# Patient Record
Sex: MALE | Race: WHITE | HISPANIC OR LATINO | URBAN - METROPOLITAN AREA
[De-identification: names, ages, dates, MRNs, and addresses within clinical notes are randomized per-mention and may not be internally consistent; named-entity substitution may affect disease eponyms.]

---

## 2019-08-13 ENCOUNTER — INPATIENT (INPATIENT)
Facility: HOSPITAL | Age: 69
LOS: 1 days | Discharge: HOME | End: 2019-08-15
Attending: HOSPITALIST | Admitting: HOSPITALIST
Payer: COMMERCIAL

## 2019-08-13 VITALS
TEMPERATURE: 98 F | HEART RATE: 96 BPM | OXYGEN SATURATION: 99 % | RESPIRATION RATE: 18 BRPM | SYSTOLIC BLOOD PRESSURE: 152 MMHG | WEIGHT: 175.05 LBS | HEIGHT: 70 IN | DIASTOLIC BLOOD PRESSURE: 91 MMHG

## 2019-08-13 DIAGNOSIS — I10 ESSENTIAL (PRIMARY) HYPERTENSION: ICD-10-CM

## 2019-08-13 DIAGNOSIS — M86.9 OSTEOMYELITIS, UNSPECIFIED: ICD-10-CM

## 2019-08-13 DIAGNOSIS — E78.5 HYPERLIPIDEMIA, UNSPECIFIED: ICD-10-CM

## 2019-08-13 LAB
ALBUMIN SERPL ELPH-MCNC: 3.7 G/DL — SIGNIFICANT CHANGE UP (ref 3.3–5)
ALP SERPL-CCNC: 112 U/L — SIGNIFICANT CHANGE UP (ref 40–120)
ALT FLD-CCNC: 12 U/L — SIGNIFICANT CHANGE UP (ref 12–78)
ANION GAP SERPL CALC-SCNC: 10 MMOL/L — SIGNIFICANT CHANGE UP (ref 5–17)
APTT BLD: 30 SEC — SIGNIFICANT CHANGE UP (ref 27.5–36.3)
AST SERPL-CCNC: 21 U/L — SIGNIFICANT CHANGE UP (ref 15–37)
BASOPHILS # BLD AUTO: 0.05 K/UL — SIGNIFICANT CHANGE UP (ref 0–0.2)
BASOPHILS NFR BLD AUTO: 0.8 % — SIGNIFICANT CHANGE UP (ref 0–2)
BILIRUB SERPL-MCNC: 0.4 MG/DL — SIGNIFICANT CHANGE UP (ref 0.2–1.2)
BUN SERPL-MCNC: 17 MG/DL — SIGNIFICANT CHANGE UP (ref 7–23)
CALCIUM SERPL-MCNC: 9.4 MG/DL — SIGNIFICANT CHANGE UP (ref 8.5–10.1)
CHLORIDE SERPL-SCNC: 111 MMOL/L — HIGH (ref 96–108)
CO2 SERPL-SCNC: 24 MMOL/L — SIGNIFICANT CHANGE UP (ref 22–31)
CREAT SERPL-MCNC: 1.04 MG/DL — SIGNIFICANT CHANGE UP (ref 0.5–1.3)
CRP SERPL-MCNC: 0.52 MG/DL — HIGH (ref 0–0.4)
EOSINOPHIL # BLD AUTO: 0.18 K/UL — SIGNIFICANT CHANGE UP (ref 0–0.5)
EOSINOPHIL NFR BLD AUTO: 2.9 % — SIGNIFICANT CHANGE UP (ref 0–6)
ERYTHROCYTE [SEDIMENTATION RATE] IN BLOOD: 43 MM/HR — HIGH (ref 0–20)
GLUCOSE SERPL-MCNC: 100 MG/DL — HIGH (ref 70–99)
HCT VFR BLD CALC: 37.3 % — LOW (ref 39–50)
HGB BLD-MCNC: 12.1 G/DL — LOW (ref 13–17)
IMM GRANULOCYTES NFR BLD AUTO: 0.8 % — SIGNIFICANT CHANGE UP (ref 0–1.5)
INR BLD: 0.97 RATIO — SIGNIFICANT CHANGE UP (ref 0.88–1.16)
LYMPHOCYTES # BLD AUTO: 0.98 K/UL — LOW (ref 1–3.3)
LYMPHOCYTES # BLD AUTO: 15.6 % — SIGNIFICANT CHANGE UP (ref 13–44)
MCHC RBC-ENTMCNC: 30.7 PG — SIGNIFICANT CHANGE UP (ref 27–34)
MCHC RBC-ENTMCNC: 32.4 GM/DL — SIGNIFICANT CHANGE UP (ref 32–36)
MCV RBC AUTO: 94.7 FL — SIGNIFICANT CHANGE UP (ref 80–100)
MONOCYTES # BLD AUTO: 0.72 K/UL — SIGNIFICANT CHANGE UP (ref 0–0.9)
MONOCYTES NFR BLD AUTO: 11.5 % — SIGNIFICANT CHANGE UP (ref 2–14)
NEUTROPHILS # BLD AUTO: 4.3 K/UL — SIGNIFICANT CHANGE UP (ref 1.8–7.4)
NEUTROPHILS NFR BLD AUTO: 68.4 % — SIGNIFICANT CHANGE UP (ref 43–77)
NRBC # BLD: 0 /100 WBCS — SIGNIFICANT CHANGE UP (ref 0–0)
PLATELET # BLD AUTO: 305 K/UL — SIGNIFICANT CHANGE UP (ref 150–400)
POTASSIUM SERPL-MCNC: 5 MMOL/L — SIGNIFICANT CHANGE UP (ref 3.5–5.3)
POTASSIUM SERPL-SCNC: 5 MMOL/L — SIGNIFICANT CHANGE UP (ref 3.5–5.3)
PROT SERPL-MCNC: 8.2 GM/DL — SIGNIFICANT CHANGE UP (ref 6–8.3)
PROTHROM AB SERPL-ACNC: 10.8 SEC — SIGNIFICANT CHANGE UP (ref 10–12.9)
RBC # BLD: 3.94 M/UL — LOW (ref 4.2–5.8)
RBC # FLD: 12.5 % — SIGNIFICANT CHANGE UP (ref 10.3–14.5)
SODIUM SERPL-SCNC: 145 MMOL/L — SIGNIFICANT CHANGE UP (ref 135–145)
WBC # BLD: 6.28 K/UL — SIGNIFICANT CHANGE UP (ref 3.8–10.5)
WBC # FLD AUTO: 6.28 K/UL — SIGNIFICANT CHANGE UP (ref 3.8–10.5)

## 2019-08-13 PROCEDURE — 99223 1ST HOSP IP/OBS HIGH 75: CPT

## 2019-08-13 PROCEDURE — 73130 X-RAY EXAM OF HAND: CPT | Mod: 26,RT

## 2019-08-13 PROCEDURE — 99285 EMERGENCY DEPT VISIT HI MDM: CPT

## 2019-08-13 RX ORDER — LOSARTAN POTASSIUM 100 MG/1
25 TABLET, FILM COATED ORAL DAILY
Refills: 0 | Status: DISCONTINUED | OUTPATIENT
Start: 2019-08-13 | End: 2019-08-15

## 2019-08-13 RX ORDER — VANCOMYCIN HCL 1 G
1000 VIAL (EA) INTRAVENOUS ONCE
Refills: 0 | Status: COMPLETED | OUTPATIENT
Start: 2019-08-13 | End: 2019-08-13

## 2019-08-13 RX ORDER — PIPERACILLIN AND TAZOBACTAM 4; .5 G/20ML; G/20ML
3.38 INJECTION, POWDER, LYOPHILIZED, FOR SOLUTION INTRAVENOUS ONCE
Refills: 0 | Status: COMPLETED | OUTPATIENT
Start: 2019-08-13 | End: 2019-08-13

## 2019-08-13 RX ORDER — ACETAMINOPHEN 500 MG
650 TABLET ORAL EVERY 6 HOURS
Refills: 0 | Status: DISCONTINUED | OUTPATIENT
Start: 2019-08-13 | End: 2019-08-15

## 2019-08-13 RX ORDER — SIMVASTATIN 20 MG/1
20 TABLET, FILM COATED ORAL AT BEDTIME
Refills: 0 | Status: DISCONTINUED | OUTPATIENT
Start: 2019-08-13 | End: 2019-08-15

## 2019-08-13 RX ORDER — SIMVASTATIN 20 MG/1
1 TABLET, FILM COATED ORAL
Qty: 0 | Refills: 0 | DISCHARGE

## 2019-08-13 RX ADMIN — SIMVASTATIN 20 MILLIGRAM(S): 20 TABLET, FILM COATED ORAL at 22:10

## 2019-08-13 NOTE — ED ADULT TRIAGE NOTE - CHIEF COMPLAINT QUOTE
pt complaining of redness, pain and swelling to right hand pinky finger times 2 weeks. was started on oral antibiotics by PMD with no effect.  denies any medical history.

## 2019-08-13 NOTE — H&P ADULT - NSHPPHYSICALEXAM_GEN_ALL_CORE
ICU Vital Signs Last 24 Hrs  T(C): 36.7 (13 Aug 2019 10:58), Max: 36.7 (13 Aug 2019 10:58)  T(F): 98.1 (13 Aug 2019 10:58), Max: 98.1 (13 Aug 2019 10:58)  HR: 96 (13 Aug 2019 10:58) (96 - 96)  BP: 152/91 (13 Aug 2019 10:58) (152/91 - 152/91)  BP(mean): --  ABP: --  ABP(mean): --  RR: 18 (13 Aug 2019 10:58) (18 - 18)  SpO2: 99% (13 Aug 2019 10:58) (99% - 99%)  GENERAL: NAD well-developed  HEAD:  Atraumatic, Normocephalic  EYES: EOMI, PERRLA, conjunctiva and sclera clear  ENMT: No tonsillar erythema, exudates, or enlargement; Moist mucous membranes, Good dentition, No lesions  NECK: Supple, No JVD, Normal thyroid  NERVOUS SYSTEM:  Alert & Oriented X3, Good concentration; Motor Strength 5/5 B/L upper and lower extremities; DTRs 2+ intact and symmetric  CHEST/LUNG: Clear to percussion bilaterally; No rales, rhonchi, wheezing, or rubs  HEART: Regular rate and rhythm; No murmurs, rubs, or gallops  ABDOMEN: Soft, Nontender, Nondistended; Bowel sounds present  EXTREMITIES:  2+ Peripheral Pulses, No clubbing, cyanosis, or edema  LYMPH: No lymphadenopathy   SKIN: No rashes or lesions

## 2019-08-13 NOTE — CONSULT NOTE ADULT - SUBJECTIVE AND OBJECTIVE BOX
Infectious Diseases - Attending at Dr. Harris    HPI:  · 69yo male with no pertinent pmh presents with rt 5th PIP joint swelling x 2 weeks, started mildly but gettting worse/bigger. Seen by PMD 3 days ago, taken 3 days of abx, no improvement so came to ER.  Denies fever, trauma, open wounds/bug bites.	     PAST MEDICAL & SURGICAL HISTORY:  Hyperlipemia  HTN (hypertension)  No significant past surgical history      Allergies    No Known Allergies    Intolerances        FAMILY HISTORY: no pertinent history of DM, HTN on parents ,siblings      SOCIAL HISTORY: non smoker    REVIEW OF SYSTEMS:    Constitutional: No fever, weight loss or fatigue  Eyes: No eye pain, visual disturbances, or discharge  ENT:  No difficulty hearing, tinnitus, vertigo; No sinus or throat pain  Neck: No pain or stiffness  Respiratory: No cough, wheezing, chills or hemoptysis  Cardiovascular: No chest pain, palpitations, shortness of breath, dizziness or leg swelling  Gastrointestinal: No abdominal or epigastric pain. No nausea, vomiting or hematemesis; No diarrhea or constipation. No melena or hematochezia.  Genitourinary: No dysuria, frequency, hematuria or incontinence  Neurological: No headaches, memory loss, loss of strength, numbness or tremors  Skin: Right fifth finger swelling,  No itching, burning, rashes or lesions       MEDICATIONS  (STANDING):    MEDICATIONS  (PRN):      Vital Signs Last 24 Hrs  T(C): 36.7 (13 Aug 2019 10:58), Max: 36.7 (13 Aug 2019 10:58)  T(F): 98.1 (13 Aug 2019 10:58), Max: 98.1 (13 Aug 2019 10:58)  HR: 96 (13 Aug 2019 10:58) (96 - 96)  BP: 152/91 (13 Aug 2019 10:58) (152/91 - 152/91)  BP(mean): --  RR: 18 (13 Aug 2019 10:58) (18 - 18)  SpO2: 99% (13 Aug 2019 10:58) (99% - 99%)    PHYSICAL EXAM:    Constitutional: NAD, well-groomed, well-developed  HEENT: PERRLA, EOMI, Normal Hearing, MMM  Neck: No LAD, No JVD  Back: Normal spine flexure, No CVA tenderness  Respiratory: CTAB/L  Cardiovascular: S1 and S2, RRR, no M/G/R  Gastrointestinal: BS+, soft, NT/ND  Extremities: No peripheral edema  Vascular: 2+ peripheral pulses  Neurological: A/O x 3, no focal deficits  Skin: right  fifth finger swelling with tenderness ,skin peeling over the finger      LABS:                        12.1   6.28  )-----------( 305      ( 13 Aug 2019 14:12 )             37.3     08-13    145  |  111<H>  |  17  ----------------------------<  100<H>  5.0   |  24  |  1.04    Ca    9.4      13 Aug 2019 14:12    TPro  8.2  /  Alb  3.7  /  TBili  0.4  /  DBili  x   /  AST  21  /  ALT  12  /  AlkPhos  112  08-13    ESR 43      MICROBIOLOGY:  RECENT CULTURES:        RADIOLOGY & ADDITIONAL STUDIES:    Xray  INTERPRETATION:  Right hand attention fifth finger    HISTORY: Swelling at PIP joint      Three views of the right hand show soft tissue swelling of the fifth   finger with decreased bone density of the distal aspect of the fifth   proximal phalanx which raises the possibility of underlying bone   infection. The joint spaces are maintained.    IMPRESSION: Possible early osteomyelitis, fifth proximal phalanx.

## 2019-08-13 NOTE — ED PROVIDER NOTE - PHYSICAL EXAMINATION
Gen: Alert, Well appearing. NAD    Head: NC, AT, PERRL, normal lids  Neck: supple, no tenderness/meningismus  Pulm: Bilateral clear BS, normal resp effort  CV: RRR, no M/R/G, +dist pulses   Abd: soft, NT/ND, +BS, no guarding/rebound tenderness  Mskel: +++ 5th pip swelling and tenderness. no warmth. + mild erythema and scaly skin cr intact. + able to passively range but with pain.  Skin: no rash, no bruising  Neuro: AAOx3, no sensory/motor deficits, CN 2-12 intact

## 2019-08-13 NOTE — ED ADULT NURSE NOTE - OBJECTIVE STATEMENT
pt a7O x3, pt c/o of 2 weeks worsening swelling in right 5ht digits. intermittent pain. PMh htn, high cholesterol. no difficulty with ROM, sensation intact

## 2019-08-13 NOTE — H&P ADULT - ASSESSMENT
68m with history of htn/cholesterol and chest pain pain to the right 5th fingfer - as per dr castro no antibiotics till after mri and decision by plastics regarding aspiration       IMPROVE VTE Individual Risk Assessment          RISK                                                          Points  [  ] Previous VTE                                                3  [  ] Thrombophilia                                             2  [  ] Lower limb paralysis                                   2        (unable to hold up >15 seconds)    [  ] Current Cancer                                             2         (within 6 months)  [  ] Immobilization > 24 hrs                              1  [  ] ICU/CCU stay > 24 hours                             1  [ x ] Age > 60                                                         1    IMPROVE VTE Score:         [     1    ]    Total Risk Factor Score:    0 - 1:   Consider IPC  >2 - 3:  Thromboprophylaxis required (enoxaparin or SQ heparin)        >4:   High Risk: Thromboprophylaxis required (enoxaparin or SQ heparin), optional add IPC  **If CONTRAINDICATION to enoxaparin or SQ heparin, USE IPCs**

## 2019-08-13 NOTE — H&P ADULT - NSHPLANGLIMITEDENGLISH_GEN_A_CORE
Patient remains tobacco free at this time. Patient is using strategies previously discussed. The patient remains on the prescribed tobacco cessation medication regimen of 1 mg Chantix BID without any negative side effects at this time.   She dis try not using the nicotine patch for a few days as I had recommended and she believes that the patch does aid in keeping urges to minimum. I will wean the dose to 14 mg and encouraged her to wean off completely. The patient will continue to come to the clinic for additional support and encouragement.   No

## 2019-08-13 NOTE — H&P ADULT - NSHPREVIEWOFSYSTEMS_GEN_ALL_CORE

## 2019-08-13 NOTE — ED PROVIDER NOTE - OBJECTIVE STATEMENT
69yo male with no pertinent pmh presents with rt 5th PIP joint swelling x 2 weeks, started mildly but gettting worse/bigger. Seen by PMD 3 days ago, taken 3 days of abx, no improvement so came to ER.  Denies fever, trauma, open wounds/bug bites.    ROS: No fever/chills. No photophobia/eye pain/changes in vision, No ear pain/sore throat/dysphagia, No chest pain/palpitations. No SOB/cough. No abdominal pain, No N/V/D, no black/bloody bm. No dysuria/frequency/discharge, No headache. No Dizziness.  No rash.  No numbness/tingling/weakness.

## 2019-08-13 NOTE — PROGRESS NOTE ADULT - SUBJECTIVE AND OBJECTIVE BOX
· 67yo male with no pertinent pmh presents with rt 5th PIP joint swelling x 2 weeks, started mildly but gettting worse/bigger. Seen by PMD 3 days ago, taken 3 days of abx, no improvement so came to ER.  Denies fever, trauma, open wounds/bug bites.	 No fever/chills. No photophobia/eye pain/changes in vision, No ear pain/sore throat/dysphagia, No chest pain/palpitations. No SOB/cough. No abdominal pain, No N/V/D, no black/bloody bm. No dysuria/frequency/discharge, No headache. No Dizziness.  No rash.  No numbness/tingling/weakness.    Plastic surgery was called for consult. Review of the chart and HPI reveals that there is no evidence of any traumatic injury to the digit, no laceration noted, no open wounds. Since this is the case, the patient needs ot be worked up for other etiologies for his PIP swelling.    Workup needs to included: Gout, Infectious arthritis, or immune related arthritis. Patient does not have any other history of issues with this joint. WBC is not elevated. X-ray shows some destruction of the joint, but this may not be due to a clear etiology.     Recommendations:)    1.) Warm Compressed over the joint 3x/day  2.) Elevated the Right Hand with 3-4 Pillows to aid in drainage  3.) MRI to evaluate for Osteomyelitis  4.) IV ABX - ID Consult  5.) Uric Acid Levels  6.) Patient has no laceration or traumatic cause for his swelling, therefore this is not a surgical issues at this time. If he truly has Osteomyelitis, then he needs 6 weeks of ABX treatment (Conservative management). If conservative management fails, then surgical intervention with debridement or amputation will be considered.     Will try and see the patient Thursday, however I will be going out of town. If not Thursday, Next Monday.  If the patient is discharged, please have him follow up in my office next week for evaluation of the joint. At this time imaging should be performed and a trial of IV ABX should be initiated for at least 2-3 days in hospital.    Will continue to follow the patient.

## 2019-08-13 NOTE — ED PROVIDER NOTE - CLINICAL SUMMARY MEDICAL DECISION MAKING FREE TEXT BOX
xray with possible osteo, will admit for iv abx. dr johnson aware. dr burnham paged for hand. and dr page paged for ID.

## 2019-08-14 LAB
HCV AB S/CO SERPL IA: 0.09 S/CO — SIGNIFICANT CHANGE UP (ref 0–0.99)
HCV AB SERPL-IMP: SIGNIFICANT CHANGE UP
URATE SERPL-MCNC: 7 MG/DL — SIGNIFICANT CHANGE UP (ref 3.4–8.8)

## 2019-08-14 PROCEDURE — 93010 ELECTROCARDIOGRAM REPORT: CPT

## 2019-08-14 PROCEDURE — 99233 SBSQ HOSP IP/OBS HIGH 50: CPT

## 2019-08-14 PROCEDURE — 99232 SBSQ HOSP IP/OBS MODERATE 35: CPT

## 2019-08-14 PROCEDURE — 99223 1ST HOSP IP/OBS HIGH 75: CPT

## 2019-08-14 PROCEDURE — 73218 MRI UPPER EXTREMITY W/O DYE: CPT | Mod: 26,RT

## 2019-08-14 RX ORDER — INDOMETHACIN 50 MG
50 CAPSULE ORAL THREE TIMES A DAY
Refills: 0 | Status: DISCONTINUED | OUTPATIENT
Start: 2019-08-14 | End: 2019-08-15

## 2019-08-14 RX ADMIN — Medication 50 MILLIGRAM(S): at 22:08

## 2019-08-14 RX ADMIN — SIMVASTATIN 20 MILLIGRAM(S): 20 TABLET, FILM COATED ORAL at 22:08

## 2019-08-14 RX ADMIN — Medication 50 MILLIGRAM(S): at 22:09

## 2019-08-14 RX ADMIN — Medication 50 MILLIGRAM(S): at 22:39

## 2019-08-14 RX ADMIN — LOSARTAN POTASSIUM 25 MILLIGRAM(S): 100 TABLET, FILM COATED ORAL at 05:20

## 2019-08-14 RX ADMIN — Medication 50 MILLIGRAM(S): at 22:38

## 2019-08-14 NOTE — PROGRESS NOTE ADULT - SUBJECTIVE AND OBJECTIVE BOX
Infectious Diseases - Attending at Dr. Harris    HPI:  · 67yo male with no pertinent pmh presents with rt 5th PIP joint swelling x 2 weeks, started mildly but gettting worse/bigger. Seen by PMD 3 days ago, taken 3 days of abx, no improvement so came to ER.  Denies fever, trauma, open wounds/bug bites. No fever/chills. No photophobia/eye pain/changes in vision, No ear pain/sore throat/dysphagia, No chest pain/palpitations. No SOB/cough. No abdominal pain, No N/V/D, no black/bloody bm. No dysuria/frequency/discharge, No headache. No Dizziness.  No rash.  No numbness/tingling/weakness. (13 Aug 2019 15:28)        PAST MEDICAL & SURGICAL HISTORY:  Hyperlipemia  HTN (hypertension)  No significant past surgical history      Allergies    No Known Allergies    Intolerances        FAMILY HISTORY:      SOCIAL HISTORY:    REVIEW OF SYSTEMS:    Constitutional: No fever, weight loss or fatigue  Eyes: No eye pain, visual disturbances, or discharge  ENT:  No difficulty hearing, tinnitus, vertigo; No sinus or throat pain  Neck: No pain or stiffness  Respiratory: No cough, wheezing, chills or hemoptysis  Cardiovascular: No chest pain, palpitations, shortness of breath, dizziness or leg swelling  Gastrointestinal: No abdominal or epigastric pain. No nausea, vomiting or hematemesis; No diarrhea or constipation. No melena or hematochezia.  Genitourinary: No dysuria, frequency, hematuria or incontinence  Neurological: No headaches, memory loss, loss of strength, numbness or tremors  Skin: No itching, burning, rashes or lesions       MEDICATIONS  (STANDING):  losartan 25 milliGRAM(s) Oral daily  simvastatin 20 milliGRAM(s) Oral at bedtime    MEDICATIONS  (PRN):  acetaminophen   Tablet .. 650 milliGRAM(s) Oral every 6 hours PRN Temp greater or equal to 38C (100.4F), Mild Pain (1 - 3)      Vital Signs Last 24 Hrs  T(C): 37 (14 Aug 2019 11:23), Max: 37.2 (13 Aug 2019 18:10)  T(F): 98.6 (14 Aug 2019 11:23), Max: 98.9 (13 Aug 2019 18:10)  HR: 75 (14 Aug 2019 11:23) (62 - 77)  BP: 126/76 (14 Aug 2019 11:23) (126/76 - 159/98)  BP(mean): --  RR: 17 (14 Aug 2019 11:23) (17 - 17)  SpO2: 98% (14 Aug 2019 11:23) (95% - 100%)    PHYSICAL EXAM:    Constitutional: NAD, well-groomed, well-developed  HEENT: PERRLA, EOMI, Normal Hearing, MMM  Neck: No LAD, No JVD  Back: Normal spine flexure, No CVA tenderness  Respiratory: CTAB/L  Cardiovascular: S1 and S2, RRR, no M/G/R  Gastrointestinal: BS+, soft, NT/ND  Extremities: No peripheral edema  Vascular: 2+ peripheral pulses  Neurological: A/O x 3, no focal deficits  Skin: No rashes      LABS:                        12.1   6.28  )-----------( 305      ( 13 Aug 2019 14:12 )             37.3     08-13    145  |  111<H>  |  17  ----------------------------<  100<H>  5.0   |  24  |  1.04    Ca    9.4      13 Aug 2019 14:12    TPro  8.2  /  Alb  3.7  /  TBili  0.4  /  DBili  x   /  AST  21  /  ALT  12  /  AlkPhos  112  08-13    PT/INR - ( 13 Aug 2019 15:49 )   PT: 10.8 sec;   INR: 0.97 ratio         PTT - ( 13 Aug 2019 15:49 )  PTT:30.0 sec      MICROBIOLOGY:  RECENT CULTURES:        RADIOLOGY & ADDITIONAL STUDIES: Patient is a 68y old  Male who presents with a chief complaint of finger septic arthritis (14 Aug 2019 16:22)      INTERVAL HPI / OVERNIGHT EVENTS: doing ok     MEDICATIONS  (STANDING):  losartan 25 milliGRAM(s) Oral daily  simvastatin 20 milliGRAM(s) Oral at bedtime    MEDICATIONS  (PRN):  acetaminophen   Tablet .. 650 milliGRAM(s) Oral every 6 hours PRN Temp greater or equal to 38C (100.4F), Mild Pain (1 - 3)      Vital Signs Last 24 Hrs  T(C): 37 (14 Aug 2019 11:23), Max: 37.2 (13 Aug 2019 18:10)  T(F): 98.6 (14 Aug 2019 11:23), Max: 98.9 (13 Aug 2019 18:10)  HR: 75 (14 Aug 2019 11:23) (62 - 77)  BP: 126/76 (14 Aug 2019 11:23) (126/76 - 159/98)  BP(mean): --  RR: 17 (14 Aug 2019 11:23) (17 - 17)  SpO2: 98% (14 Aug 2019 11:23) (95% - 100%)    Review of systems:  General : no fever /chills,fatigue  CVS : no chest pain, palpitations  Lungs : no shortness of breath, cough  GI : no abdominal pain,vomiting, diarrhea   : no dysuria,hematuria        PHYSICAL EXAM:  General :NAD  Constitutional:  well-groomed, well-developed  Respiratory: CTAB/L  Cardiovascular: S1 and S2, RRR, no M/G/R  Gastrointestinal: BS+, soft, NT/ND  Extremities: No peripheral edema  Vascular: 2+ peripheral pulses  Skin: right fifth finger swelling      LABS:   ESR 43  CRP 0.52  Uric acid WNL             12.1   6.28  )-----------( 305      ( 13 Aug 2019 14:12 )             37.3     08-13    145  |  111<H>  |  17  ----------------------------<  100<H>  5.0   |  24  |  1.04    Ca    9.4      13 Aug 2019 14:12    TPro  8.2  /  Alb  3.7  /  TBili  0.4  /  DBili  x   /  AST  21  /  ALT  12  /  AlkPhos  112  08-13          MICROBIOLOGY:  RECENT CULTURES:        RADIOLOGY & ADDITIONAL STUDIES:    MRI hand:   IMPRESSION: Subluxations of the second, third and fourth MCP joints most   prominently involving the third digit with associated osseous productive   change and capsular thickening/degeneration. These findings may be   secondary to CPPD arthropathy or some combination of a degenerative and   inflammatory arthropathy.    Findings highly suggestive of osteomyelitis of the distal half of the   fifth digit proximal phalanx and the base of the middle phalanx with   septic arthritis at the PIP joint and marked adjacent soft tissue   swelling. Small fluid collection at the dorsal aspect of the extensor   tendon that may represent infected fluid as well.

## 2019-08-14 NOTE — PROGRESS NOTE ADULT - PROBLEM SELECTOR PLAN 1
mri of hand reviewed and highly suspicious of osteo and septic joint of  the right 5th PIP. discussed with hand surgeon who will aspirate the joint for cell count/culture and crystal analysis. Pt has no WBC and remains afebrile  clinically has RA based on PE and is so there could be a component of inflammatory disease.  Will get rheumatology consult.   plastics/id consult

## 2019-08-14 NOTE — CONSULT NOTE ADULT - SUBJECTIVE AND OBJECTIVE BOX
HISTORY OF PRESENT ILLNESS:    Patient is a 68y Male    HPI:  68 year old male with PMHx as listed below reports that he was in his USOH until 2.5 weeks ago, when he began to experience progressively worsening pain/swelling in his right 5th PIP.  The pain is constant, though worse with activity and better at rest.  He describes the pain as achy - was 9 out of 10.  No AM stiffness.   After about 1 week, it did not improve, so he went to his PMD who prescribed abx x 3 days, but no improvement so he came in to the ED.  He currently reports that the pain and swelling have begun to improve - pain now down to 5 out of 10.  No pain ni the rest of his joints.  No similar previous episodes.    PAST MEDICAL & SURGICAL HISTORY:  Hyperlipemia  HTN (hypertension)  No significant past surgical history      ROS: No fever/chills, wt loss, night sweats, chest pain/dyspnea/cough, oral ulcers, rashes, alopecia, photosensitivity, dry eye/dry mouth, Raynaud's, dysphagia, focal weakness, or eye symptoms.  No personal or family hx of psoriasis, no hx of uveitis/dactylitis, no inflammatory back pain, no IBD symptoms.    MEDICATIONS  (STANDING):  losartan 25 milliGRAM(s) Oral daily  simvastatin 20 milliGRAM(s) Oral at bedtime    MEDICATIONS  (PRN):  acetaminophen   Tablet .. 650 milliGRAM(s) Oral every 6 hours PRN Temp greater or equal to 38C (100.4F), Mild Pain (1 - 3)      Allergies    No Known Allergies    Intolerances        FAMILY HISTORY:      SOCIAL HISTORY:  Tobacco--   none            Vital Signs Last 24 Hrs  T(C): 36.7 (14 Aug 2019 17:02), Max: 37 (14 Aug 2019 11:23)  T(F): 98.1 (14 Aug 2019 17:02), Max: 98.6 (14 Aug 2019 11:23)  HR: 74 (14 Aug 2019 17:02) (62 - 75)  BP: 131/96 (14 Aug 2019 17:02) (126/76 - 133/88)  BP(mean): --  RR: 18 (14 Aug 2019 17:02) (17 - 18)  SpO2: 100% (14 Aug 2019 17:02) (95% - 100%)    PHYSICAL EXAM:  General :  NAD  HEENT--  no oral ulcers  Nodes--   LAD  Lungs--  CTA B/L  Heart--  RRR, nlS1 &S2 normal;   Abdomen--  soft, NT, ND +BS  Skin:  no rashes  Musculoskeletal exam:  (+)swelling/tenderness, warmth, mild erythema in right 5th PIP;  pt able to flex right 5th PIP, though ROM is reduced;  No synovitis in rest of joints;  (+)Heberden's nodes;  (+)bony enlargement of left 4th PIP;  (+)ulnar deviation of right 2nd finger      LABS:                        12.1   6.28  )-----------( 305      ( 13 Aug 2019 14:12 )             37.3     08-13    145  |  111<H>  |  17  ----------------------------<  100<H>  5.0   |  24  |  1.04    Ca    9.4      13 Aug 2019 14:12    TPro  8.2  /  Alb  3.7  /  TBili  0.4  /  DBili  x   /  AST  21  /  ALT  12  /  AlkPhos  112  08-13    PT/INR - ( 13 Aug 2019 15:49 )   PT: 10.8 sec;   INR: 0.97 ratio         PTT - ( 13 Aug 2019 15:49 )  PTT:30.0 sec    Uric Acid, Serum (08.14.19 @ 12:47)    Uric Acid, Serum: 7.0 mg/dL    Sedimentation Rate, Erythrocyte (08.13.19 @ 14:12)    Sedimentation Rate, Erythrocyte: 43 mm/hr    C-Reactive Protein, Serum (08.13.19 @ 19:54)    C-Reactive Protein, Serum: 0.52 mg/dL          RADIOLOGY & ADDITIONAL STUDIES:    < from: Xray Hand 3 Views, Right (08.13.19 @ 13:36) >  EXAM:  HAND COMPLETE  3 VIEWS RT                            PROCEDURE DATE:  08/13/2019          INTERPRETATION:  Right hand attention fifth finger    HISTORY: Swelling at PIP joint      Three views of the right hand show soft tissue swelling of the fifth   finger with decreased bone density of the distal aspect of the fifth   proximal phalanx which raises the possibility of underlying bone   infection. The joint spaces are maintained.    IMPRESSION: Possible early osteomyelitis, fifth proximalphalanx.    < end of copied text >    < from: MR Hand No Cont, Right (08.14.19 @ 09:51) >  EXAM:  MR HAND RT                            PROCEDURE DATE:  08/14/2019          INTERPRETATION:  MRI OF THE RIGHT HAND.    CLINICAL INDICATIONS: Fifth digit swelling. Evaluate for osteomyelitis.     TECHNIQUE: Multiplanar MR imaging was obtained of the right hand.    FINDINGS: There is palmar subluxation of the third digit proximal phalanx   with respect to the metacarpal head with marked capsular thickening and   degeneration along with osseous productive change. Similar findings with   a lesser degree of subluxation is noted of the second and fourth digit   MCP joints with associated arthrosis.    Evaluation of the fifth digit, demonstrates diffuse low T1 signal within   the mid and distal aspect of the fifth digit proximal phalanx as well as   at the base of the middle phalanx with associated bone marrow edema.   There is marked surrounding periosteal soft tissue edema with a complex   appearing debris filled joint effusion at the fifth PIP joint. These   findings appear most compatible with with osteomyelitis and septic   arthritis. There is a loculated fluid collection at the dorsal aspect of   the fifth digit extensor tendon. This measures 11 mm x 4 mm x 26 mm. A   soft tissue abscess cannot be excluded.    There is circumferential subcutaneous soft tissue edema of the fifth   digit particularly at the level of the PIP joint.    IMPRESSION: Subluxations of the second, third and fourth MCP joints most   prominently involving the third digit with associated osseous productive   change and capsular thickening/degeneration. These findings may be   secondary to CPPD arthropathy or some combination of a degenerative and   inflammatory arthropathy.    Findings highly suggestive of osteomyelitis of the distal half of the   fifth digit proximal phalanx and the base of the middle phalanx with   septic arthritis at the PIP joint and marked adjacent soft tissue   swelling. Small fluid collection at the dorsal aspect of the extensor   tendon that may represent infected fluid as well.    < end of copied text >

## 2019-08-14 NOTE — PROGRESS NOTE ADULT - SUBJECTIVE AND OBJECTIVE BOX
Patient is a 68y old  Male who presents with a chief complaint of finger septic arthritis (14 Aug 2019 16:22)      INTERVAL HPI/OVERNIGHT EVENTS: none. remains afebrile     MEDICATIONS  (STANDING):  losartan 25 milliGRAM(s) Oral daily  simvastatin 20 milliGRAM(s) Oral at bedtime    MEDICATIONS  (PRN):  acetaminophen   Tablet .. 650 milliGRAM(s) Oral every 6 hours PRN Temp greater or equal to 38C (100.4F), Mild Pain (1 - 3)      Allergies    No Known Allergies    Intolerances      Vital Signs Last 24 Hrs  T(C): 37 (14 Aug 2019 11:23), Max: 37.2 (13 Aug 2019 18:10)  T(F): 98.6 (14 Aug 2019 11:23), Max: 98.9 (13 Aug 2019 18:10)  HR: 75 (14 Aug 2019 11:23) (62 - 77)  BP: 126/76 (14 Aug 2019 11:23) (126/76 - 159/98)  BP(mean): --  RR: 17 (14 Aug 2019 11:23) (17 - 17)  SpO2: 98% (14 Aug 2019 11:23) (95% - 100%)    PHYSICAL EXAM:  GENERAL: NAD, well-groomed, well-developed  HEAD:  Atraumatic, Normocephalic  EYES: EOMI, PERRLA, conjunctiva and sclera clear  ENMT: No tonsillar erythema, exudates, or enlargement; Moist mucous membranes, Good dentition, No lesions  NECK: Supple, No JVD, Normal thyroid  NERVOUS SYSTEM:  Alert & Oriented X3, Good concentration; Motor Strength 5/5 B/L upper and lower extremities; DTRs 2+ intact and symmetric  CHEST/LUNG: Clear to percussion bilaterally; No rales, rhonchi, wheezing, or rubs  HEART: Regular rate and rhythm; No murmurs, rubs, or gallops  ABDOMEN: Soft, Nontender, Nondistended; Bowel sounds present  EXTREMITIES:  2+ Peripheral Pulses, b/l ulnar deviations. Enlarged MCP b/l. 5 the right PIP grossly enlarged and tender. no warmth or erythema   LYMPH: No lymphadenopathy noted  SKIN: No rashes or lesions    LABS:                        12.1   6.28  )-----------( 305      ( 13 Aug 2019 14:12 )             37.3     08-13    145  |  111<H>  |  17  ----------------------------<  100<H>  5.0   |  24  |  1.04    Ca    9.4      13 Aug 2019 14:12    TPro  8.2  /  Alb  3.7  /  TBili  0.4  /  DBili  x   /  AST  21  /  ALT  12  /  AlkPhos  112  08-13    PT/INR - ( 13 Aug 2019 15:49 )   PT: 10.8 sec;   INR: 0.97 ratio         PTT - ( 13 Aug 2019 15:49 )  PTT:30.0 sec    CAPILLARY BLOOD GLUCOSE          RADIOLOGY & ADDITIONAL TESTS:    < from: MR Hand No Cont, Right (08.14.19 @ 09:51) >  MPRESSION: Subluxations of the second, third and fourth MCP joints most   prominently involving the third digit with associated osseous productive   change and capsular thickening/degeneration. These findings may be   secondary to CPPD arthropathy or some combination of a degenerative and   inflammatory arthropathy.    Findings highly suggestive of osteomyelitis of the distal half of the   fifth digit proximal phalanx and the base of the middle phalanx with   septic arthritis at the PIP joint and marked adjacent soft tissue   swelling. Small fluid collection at the dorsal aspect of the extensor   tendon that may represent infected fluid as well.    < end of copied text >      Imaging Personally Reviewed:  [x ] YES  [ ] NO    Consultant(s) Notes Reviewed:  [ ] YES  [ ] NO    Care Discussed with Consultants/Other Providers [ ] YES  [ ] NO

## 2019-08-15 ENCOUNTER — TRANSCRIPTION ENCOUNTER (OUTPATIENT)
Age: 69
End: 2019-08-15

## 2019-08-15 VITALS
DIASTOLIC BLOOD PRESSURE: 95 MMHG | SYSTOLIC BLOOD PRESSURE: 130 MMHG | HEART RATE: 75 BPM | TEMPERATURE: 98 F | OXYGEN SATURATION: 99 % | RESPIRATION RATE: 20 BRPM

## 2019-08-15 LAB
ANION GAP SERPL CALC-SCNC: 9 MMOL/L — SIGNIFICANT CHANGE UP (ref 5–17)
BUN SERPL-MCNC: 17 MG/DL — SIGNIFICANT CHANGE UP (ref 7–23)
CALCIUM SERPL-MCNC: 9.5 MG/DL — SIGNIFICANT CHANGE UP (ref 8.5–10.1)
CHLORIDE SERPL-SCNC: 108 MMOL/L — SIGNIFICANT CHANGE UP (ref 96–108)
CO2 SERPL-SCNC: 24 MMOL/L — SIGNIFICANT CHANGE UP (ref 22–31)
CREAT SERPL-MCNC: 0.95 MG/DL — SIGNIFICANT CHANGE UP (ref 0.5–1.3)
GLUCOSE SERPL-MCNC: 107 MG/DL — HIGH (ref 70–99)
HCT VFR BLD CALC: 37.7 % — LOW (ref 39–50)
HGB BLD-MCNC: 12.4 G/DL — LOW (ref 13–17)
MCHC RBC-ENTMCNC: 30.6 PG — SIGNIFICANT CHANGE UP (ref 27–34)
MCHC RBC-ENTMCNC: 32.9 GM/DL — SIGNIFICANT CHANGE UP (ref 32–36)
MCV RBC AUTO: 93.1 FL — SIGNIFICANT CHANGE UP (ref 80–100)
NRBC # BLD: 0 /100 WBCS — SIGNIFICANT CHANGE UP (ref 0–0)
PLATELET # BLD AUTO: 293 K/UL — SIGNIFICANT CHANGE UP (ref 150–400)
POTASSIUM SERPL-MCNC: 5 MMOL/L — SIGNIFICANT CHANGE UP (ref 3.5–5.3)
POTASSIUM SERPL-SCNC: 5 MMOL/L — SIGNIFICANT CHANGE UP (ref 3.5–5.3)
RBC # BLD: 4.05 M/UL — LOW (ref 4.2–5.8)
RBC # FLD: 12 % — SIGNIFICANT CHANGE UP (ref 10.3–14.5)
RHEUMATOID FACT SERPL-ACNC: 27 IU/ML — HIGH (ref 0–13)
SODIUM SERPL-SCNC: 141 MMOL/L — SIGNIFICANT CHANGE UP (ref 135–145)
WBC # BLD: 4.91 K/UL — SIGNIFICANT CHANGE UP (ref 3.8–10.5)
WBC # FLD AUTO: 4.91 K/UL — SIGNIFICANT CHANGE UP (ref 3.8–10.5)

## 2019-08-15 PROCEDURE — 99239 HOSP IP/OBS DSCHRG MGMT >30: CPT

## 2019-08-15 PROCEDURE — 99232 SBSQ HOSP IP/OBS MODERATE 35: CPT

## 2019-08-15 RX ORDER — LOSARTAN POTASSIUM 100 MG/1
1 TABLET, FILM COATED ORAL
Qty: 0 | Refills: 0 | DISCHARGE
Start: 2019-08-15

## 2019-08-15 RX ORDER — CEFPODOXIME PROXETIL 100 MG
200 TABLET ORAL EVERY 12 HOURS
Refills: 0 | Status: DISCONTINUED | OUTPATIENT
Start: 2019-08-15 | End: 2019-08-15

## 2019-08-15 RX ORDER — CEFPODOXIME PROXETIL 100 MG
1 TABLET ORAL
Qty: 20 | Refills: 0
Start: 2019-08-15 | End: 2019-08-24

## 2019-08-15 RX ORDER — INDOMETHACIN 50 MG
2 CAPSULE ORAL
Qty: 30 | Refills: 0
Start: 2019-08-15 | End: 2019-08-19

## 2019-08-15 RX ORDER — LOSARTAN POTASSIUM 100 MG/1
1 TABLET, FILM COATED ORAL
Qty: 0 | Refills: 0 | DISCHARGE

## 2019-08-15 RX ADMIN — Medication 50 MILLIGRAM(S): at 14:05

## 2019-08-15 RX ADMIN — Medication 100 MILLIGRAM(S): at 17:05

## 2019-08-15 RX ADMIN — LOSARTAN POTASSIUM 25 MILLIGRAM(S): 100 TABLET, FILM COATED ORAL at 05:40

## 2019-08-15 RX ADMIN — Medication 50 MILLIGRAM(S): at 13:05

## 2019-08-15 RX ADMIN — Medication 50 MILLIGRAM(S): at 05:41

## 2019-08-15 RX ADMIN — Medication 200 MILLIGRAM(S): at 17:05

## 2019-08-15 RX ADMIN — Medication 50 MILLIGRAM(S): at 06:11

## 2019-08-15 NOTE — PROGRESS NOTE ADULT - SUBJECTIVE AND OBJECTIVE BOX
Patient is a 68y old  Male who presents with a chief complaint of finger septic arthritis (14 Aug 2019 16:22)      INTERVAL HPI / OVERNIGHT EVENTS: finger swelling and pain improving, arthrocentesis was tried by plastics, no fluid aspirated     MEDICATIONS  (STANDING):  cefpodoxime 200 milliGRAM(s) Oral every 12 hours  doxycycline hyclate Capsule 100 milliGRAM(s) Oral every 12 hours  indomethacin 50 milliGRAM(s) Oral three times a day  losartan 25 milliGRAM(s) Oral daily  simvastatin 20 milliGRAM(s) Oral at bedtime    MEDICATIONS  (PRN):  acetaminophen   Tablet .. 650 milliGRAM(s) Oral every 6 hours PRN Temp greater or equal to 38C (100.4F), Mild Pain (1 - 3)      Vital Signs Last 24 Hrs  T(C): 36.8 (15 Aug 2019 12:27), Max: 36.8 (15 Aug 2019 12:27)  T(F): 98.3 (15 Aug 2019 12:27), Max: 98.3 (15 Aug 2019 12:27)  HR: 74 (15 Aug 2019 12:27) (58 - 74)  BP: 130/86 (15 Aug 2019 12:27) (122/84 - 131/96)  BP(mean): --  RR: 16 (15 Aug 2019 12:27) (16 - 18)  SpO2: 98% (15 Aug 2019 12:27) (98% - 100%)    Review of systems:  General : no fever /chills,fatigue  CVS : no chest pain, palpitations  Lungs : no shortness of breath, cough  GI : no abdominal pain,vomiting, diarrhea   : no dysuria,hematuria        PHYSICAL EXAM:  General :NAD  Constitutional:  well-groomed, well-developed  Respiratory: CTAB/L  Cardiovascular: S1 and S2, RRR, no M/G/R  Gastrointestinal: BS+, soft, NT/ND  Extremities: No peripheral edema, multiple finger joint swelling (less extent )   Vascular: 2+ peripheral pulses  Skin: right fifth finger swelling and tenderness slowly improving      LABS:                        12.4   4.91  )-----------( 293      ( 15 Aug 2019 10:02 )             37.7     08-15    141  |  108  |  17  ----------------------------<  107<H>  5.0   |  24  |  0.95    Ca    9.5      15 Aug 2019 10:02            MICROBIOLOGY:  RECENT CULTURES:  08-14 .Blood XXXX XXXX   No growth to date.          RADIOLOGY & ADDITIONAL STUDIES:

## 2019-08-15 NOTE — DISCHARGE NOTE PROVIDER - HOSPITAL COURSE
HPI:    · 67yo male with no pertinent pmh presents with rt 5th PIP joint swelling x 2 weeks, started mildly but gettting worse/bigger. Seen by PMD 3 days ago, taken 3 days of abx, no improvement so came to ER.  Denies fever, trauma, open wounds/bug bites.	 No fever/chills. No photophobia/eye pain/changes in vision, No ear pain/sore throat/dysphagia, No chest pain/palpitations. No SOB/cough. No abdominal pain, No N/V/D, no black/bloody bm. No dysuria/frequency/discharge, No headache. No Dizziness.  No rash.  No numbness/tingling/weakness. (13 Aug 2019 15:28)            PT was admitted for concern of Right 5th finger osteomyelitis/septic joint, which was also seen on  MRI, but clinically this seems more likely d.t inflammatory arthropathy. Pt remains afebrile with normal WBC and symptoms greatly improved with NSAIDs started last night and is off antibiotics. The joint remains with out warmth and erythema. Hand surgeon could not aspirated any fluid from the joint for analysis today. After discussion with rheumatologist, infectious disease, and hand surgeon the plan is to be discharged on roal antibiotiocs for 10 days, NSAIDS and close f/u with the team as an outpatient.                    Problem/Plan - 1:    ·  Problem: Osteomyelitis of finger.  Plan: mri of hand reviewed and highly suspicious of osteo and septic joint of  the right 5th PIP, but these findings can aslo be seen in crystal arthropathy.     f.u with Rheumatoid work up.          Problem/Plan - 2:    ·  Problem: Hyperlipemia.  Plan: continue home meds.          Problem/Plan - 3:    ·  Problem: Essential hypertension.  Plan: continue home meds.         Attending Attestation:     45 minutes spent on total encounter; more than 50% of the visit was spent counseling and/or coordinating care by the attending physician.

## 2019-08-15 NOTE — PROGRESS NOTE ADULT - SUBJECTIVE AND OBJECTIVE BOX
Patient is a 68y old  Male who presents with a chief complaint of finger septic arthritis (14 Aug 2019 16:22)      INTERVAL HPI/OVERNIGHT EVENTS: none. remains afebrile. pain and ROM  much improved with NSAIDS    MEDICATIONS  (STANDING):  cefpodoxime 200 milliGRAM(s) Oral every 12 hours  doxycycline hyclate Capsule 100 milliGRAM(s) Oral every 12 hours  indomethacin 50 milliGRAM(s) Oral three times a day  losartan 25 milliGRAM(s) Oral daily  simvastatin 20 milliGRAM(s) Oral at bedtime    MEDICATIONS  (PRN):  acetaminophen   Tablet .. 650 milliGRAM(s) Oral every 6 hours PRN Temp greater or equal to 38C (100.4F), Mild Pain (1 - 3)    Allergies    No Known Allergies    Intolerances      Vital Signs Last 24 Hrs  Vital Signs Last 24 Hrs  T(C): 36.8 (15 Aug 2019 12:27), Max: 36.8 (15 Aug 2019 12:27)  T(F): 98.3 (15 Aug 2019 12:27), Max: 98.3 (15 Aug 2019 12:27)  HR: 74 (15 Aug 2019 12:27) (58 - 74)  BP: 130/86 (15 Aug 2019 12:27) (122/84 - 131/96)  BP(mean): --  RR: 16 (15 Aug 2019 12:27) (16 - 18)  SpO2: 98% (15 Aug 2019 12:27) (98% - 100%)    PHYSICAL EXAM:  GENERAL: NAD, well-groomed, well-developed  HEAD:  Atraumatic, Normocephalic  EYES: EOMI, PERRLA, conjunctiva and sclera clear  ENMT: No tonsillar erythema, exudates, or enlargement; Moist mucous membranes, Good dentition, No lesions  NECK: Supple, No JVD, Normal thyroid  NERVOUS SYSTEM:  Alert & Oriented X3, Good concentration; Motor Strength 5/5 B/L upper and lower extremities; DTRs 2+ intact and symmetric  CHEST/LUNG: Clear to percussion bilaterally; No rales, rhonchi, wheezing, or rubs  HEART: Regular rate and rhythm; No murmurs, rubs, or gallops  ABDOMEN: Soft, Nontender, Nondistended; Bowel sounds present  EXTREMITIES:  2+ Peripheral Pulses, b/l ulnar deviations. Enlarged MCP b/l. 5 the right PIP grossly enlarged but tenderness and ROM much improved. no warmth or erythema   LYMPH: No lymphadenopathy noted  SKIN: No rashes or lesions    LABS:                                12.4   4.91  )-----------( 293      ( 15 Aug 2019 10:02 )             37.7     08-15    141  |  108  |  17  ----------------------------<  107<H>  5.0   |  24  |  0.95    Ca    9.5      15 Aug 2019 10:02    TPro  8.2  /  Alb  3.7  /  TBili  0.4  /  DBili  x   /  AST  21  /  ALT  12  /  AlkPhos  112  08-13    PT/INR - ( 13 Aug 2019 15:49 )   PT: 10.8 sec;   INR: 0.97 ratio         PTT - ( 13 Aug 2019 15:49 )  PTT:30.0 sec              RADIOLOGY & ADDITIONAL TESTS:    < from: MR Hand No Cont, Right (08.14.19 @ 09:51) >  MPRESSION: Subluxations of the second, third and fourth MCP joints most   prominently involving the third digit with associated osseous productive   change and capsular thickening/degeneration. These findings may be   secondary to CPPD arthropathy or some combination of a degenerative and   inflammatory arthropathy.    Findings highly suggestive of osteomyelitis of the distal half of the   fifth digit proximal phalanx and the base of the middle phalanx with   septic arthritis at the PIP joint and marked adjacent soft tissue   swelling. Small fluid collection at the dorsal aspect of the extensor   tendon that may represent infected fluid as well.    < end of copied text >      Imaging Personally Reviewed:  [x ] YES  [ ] NO    Consultant(s) Notes Reviewed:  [ ] YES  [ ] NO    Care Discussed with Consultants/Other Providers [ ] YES  [ ] NO

## 2019-08-15 NOTE — CONSULT NOTE ADULT - ASSESSMENT
68 year old male presents with monoarticular inflammatory arthritis of his right 5th PIP.  Septic arthritis needs to be ruled out, especially given MRI findings, though clinical presentation not typical for septic arthritis (pt non-toxic appearing, afebrile, pt able to flex the joint, though reduced).  DDx also includes crystal arthropathy (e.g. gout vs CPPD/pseudogout) vs RA flare (though less likely given monoarticular involvement) vs spondyloarthropathy (though no other obvious signs/symptoms to suggest this) vs Lyme arthritis.  Pt also w/ possible underlying RA.    - Agree with arthrocentesis - planned by hand surgeon.  Please send fluid for cell count, crystals, and culture.    - Will check bloodwork, including serologies.    - Will start indomethacin 50mg TID.
68 yr old male with h/o HTN see n with   1.Right fifth finger swelling with likely septic arthritis of PIP joint or early osteomyelitis  Plastic surgery /hand surgery consult for a possible drainage of joint effusion and c/s   hold off antibiotics till then   can be started on vanco /Rocephin after that   Meanwhile get MRI had if no contraindication   may require PICC line with 6 weeks of antibiotics   CRP in am
68 year old male with unknown etiology of right 5th PIP Joint swelling and stiffness. The patient has received Indomethicn and currently his symptoms have resolved greatly. The joint swelling has improved. There is no evidence of any erythema, fluctuance or pain noted on examination. His ROM has also improved on exam and he is able to flex at the PIP joint. The patient has no clear evidence of an infectious cause of his swelling.    Aspiration of the joint and surrounding effusion was attempted at bedside today. No yield of any fluid noted. The swelling is improving and this is mainly more edema vs a joint effusion. Sensation of the digit is also intact.    Recommendations:)    1.) Joint aspiration yielded no fluid - Most likely due to edema rather than a true effusion with fluid  2.) Etiology of swelling: Possible Gout vs inflammatory arthritis, infectious cause is low on the list due to no       edema, no erythema and no evidence of tendonitis on examination.   3.) Patient's symptoms are improving at this time, continue with right hand elevation with 2-3 pillows  4.) ABX per ID as their recommendations   5.) Rheumatology consult for further workup and evaluation  6.) No surgical intervention needed at this time    Patient has been given my contact information for follow up next week in the office to make sure the swelling does not worsen.     Thank you for consulting plastic surgery

## 2019-08-15 NOTE — DISCHARGE NOTE PROVIDER - PROVIDER TOKENS
PROVIDER:[TOKEN:[[id number]]],PROVIDER:[TOKEN:[[id number]]],PROVIDER:[TOKEN:[[id number]]]

## 2019-08-15 NOTE — DISCHARGE NOTE PROVIDER - CARE PROVIDERS DIRECT ADDRESSES
,DirectAddress_Unknown,[email or web address]

## 2019-08-15 NOTE — DISCHARGE NOTE PROVIDER - CARE PROVIDER_API CALL
Ryann Lee)  Surgery  [address] [place] [id number]  Phone: [phone]  Fax: [phone]  Follow Up Time:     Rosa Olivo)  Infectious Disease; Internal Medicine  [address] [place] [address]  Phone: [phone]  Fax: [phone]  Follow Up Time:     Jeremy Acosta)  Internal Medicine; Rheumatology  [address] [place] [address]  Phone: [phone]  Fax: [phone]  Follow Up Time:

## 2019-08-15 NOTE — PROGRESS NOTE ADULT - PROBLEM SELECTOR PLAN 1
mri of hand reviewed and highly suspicious of osteo and septic joint of  the right 5th PIP, but these findings can aslo be seen in crystal arthropathy.   f.u with Rheumatoid work up

## 2019-08-15 NOTE — CONSULT NOTE ADULT - ADDITIONAL PE
Examination of the right 5th digit reveals mild surrounding edema of the 5th PIP joint. There is no erythema or drainage noted. No evidence of any lacerations or open wounds. Patient can flex at the PIP joint without pain. No pain on palpation noted. No fluctuance noted on examination.

## 2019-08-15 NOTE — DISCHARGE NOTE PROVIDER - DISCHARGE DATE
15-Aug-2019 Patient: Ara Stanley    * No procedures listed *    Diagnosis: * No pre-op diagnosis entered *  Diagnosis Additional Information: No value filed.    Anesthesia Type:   No value filed.     Note:  Airway :ETT  Patient transferred to:ICU        Vitals: (Last set prior to Anesthesia Care Transfer)              Electronically Signed By: OTTO Way CRNA  June 5, 2017  12:41 PM

## 2019-08-15 NOTE — CONSULT NOTE ADULT - SUBJECTIVE AND OBJECTIVE BOX
Patient is a 68 year old right handed male who presented to the hospital ED with complaints of pain and swelling of his right 5th PIP joint 2 days ago. The patient states that he woke up and all of a sudden had significant swelling. He had no complaints of fevers or chills noted. No issues with any trauma, no lacerations or  bug bites noted.  Patient has no other significant history of swelling of any other joints. MRI was done which shows possible osteomyelitis due to the surrounding destruction of the joint space with some fluid/edema.     Today the patient has much improvement and his swelling has decreased. There is no pain and he is able to move his finger much better. No drainage, no erythema noted. The patient feels very good at this time.    PAST MEDICAL & SURGICAL HISTORY:  Hyperlipemia  HTN (hypertension)  No significant past surgical history    FAMILY HISTORY:  None    Social hx:  No smoking, drinking or drugs    Allergies    No Known Allergies    Intolerances        MEDICATIONS  (STANDING):  indomethacin 50 milliGRAM(s) Oral three times a day  losartan 25 milliGRAM(s) Oral daily  simvastatin 20 milliGRAM(s) Oral at bedtime    MEDICATIONS  (PRN):  acetaminophen   Tablet .. 650 milliGRAM(s) Oral every 6 hours PRN Temp greater or equal to 38C (100.4F), Mild Pain (1 - 3)    Vital Signs Last 24 Hrs  T(C): 36.2 (15 Aug 2019 05:11), Max: 37 (14 Aug 2019 11:23)  T(F): 97.2 (15 Aug 2019 05:11), Max: 98.6 (14 Aug 2019 11:23)  HR: 58 (15 Aug 2019 05:11) (58 - 75)  BP: 122/84 (15 Aug 2019 05:11) (122/84 - 131/96)  BP(mean): --  RR: 16 (15 Aug 2019 05:11) (16 - 18)  SpO2: 100% (15 Aug 2019 05:11) (98% - 100%)

## 2019-08-15 NOTE — DISCHARGE NOTE NURSING/CASE MANAGEMENT/SOCIAL WORK - NSDCDPATPORTLINK_GEN_ALL_CORE
You can access the [facility] Patient Portal, offered by [facility], by registering with the following website: [email or web address]

## 2019-08-15 NOTE — PROGRESS NOTE ADULT - ASSESSMENT
68 yr old male with h/o HTN see n with   1.Right fifth finger swelling with likely septic arthritis of PIP joint or early osteomyelitis(based ON MRI )  Plastic surgery saw  a possible drainage of joint effusion and c/s but no fluid aspirated   at this point appears more to be non infectious arthritis then septic arthritis isauro because MR i shows some other joint involvement also   agree with Plastic to treat with 10 days of antibiotics (oral for cellulitis)  pt to see plastics in 1 week for followup    d/c picc line order  will watch without starting long course of iv antibiotics for now  no fever /leukocytosis etc  cleared for d/c home
68m with history of htn/cholesterol and chest pain pain to the right 5th fingfer - as per dr castro no antibiotics till after mri and decision by plastics regarding aspiration       IMPROVE VTE Individual Risk Assessment          RISK                                                          Points  [  ] Previous VTE                                                3  [  ] Thrombophilia                                             2  [  ] Lower limb paralysis                                   2        (unable to hold up >15 seconds)    [  ] Current Cancer                                             2         (within 6 months)  [  ] Immobilization > 24 hrs                              1  [  ] ICU/CCU stay > 24 hours                             1  [ x ] Age > 60                                                         1    IMPROVE VTE Score:         [     1    ]    Total Risk Factor Score:    0 - 1:   Consider IPC  >2 - 3:  Thromboprophylaxis required (enoxaparin or SQ heparin)        >4:   High Risk: Thromboprophylaxis required (enoxaparin or SQ heparin), optional add IPC  **If CONTRAINDICATION to enoxaparin or SQ heparin, USE IPCs**
HPI:  · 67yo male with no pertinent pmh presents with rt 5th PIP joint swelling x 2 weeks, started mildly but gettting worse/bigger. Seen by PMD 3 days ago, taken 3 days of abx, no improvement so came to ER.  Denies fever, trauma, open wounds/bug bites.	 No fever/chills. No photophobia/eye pain/changes in vision, No ear pain/sore throat/dysphagia, No chest pain/palpitations. No SOB/cough. No abdominal pain, No N/V/D, no black/bloody bm. No dysuria/frequency/discharge, No headache. No Dizziness.  No rash.  No numbness/tingling/weakness. (13 Aug 2019 15:28)      PT was admitted for concern of Right 5th finger osteomyelitis/septic joint, which was also seen on  MRI, but clinically this seems more likely d.t inflammatory arthropathy. Pt remains afebrile with normal WBC and symptoms greatly improved with NSAIDs started last night and is off antibiotics. The joint remains with out warmth and erythema. Hand surgeon could not aspirated any fluid from the joint for analysis today. After discussion with rheumatologist, infectious disease, and hand surgeon the plan is to be discharged on roal antibiotiocs for 10 days, NSAIDS and close f/u with the team as an outpatient.
68 yr old male with h/o HTN see n with   1.Right fifth finger swelling with  septic arthritis of PIP joint and  osteomyelitis of the finger  Plastic surgery /hand surgery consult for a possible drainage of joint effusion and c/s   hold off antibiotics till then   can be started on vanco /Rocephin after that   Spoke with Dr Lee,he will aspirate the PIP joint tomorrow  working on getting approval for teflaro 600 mg IV q 12 hrs X 6 week  Meanwhile get MRI had if no contraindication   WIll require PICC line with 6 weeks of antibiotics ,will order for am   CRP in am   Pt to be discharged home with picc after drainage sent for c/s   will follow up that outpt and change regimen accordingly as needed

## 2019-08-15 NOTE — DISCHARGE NOTE PROVIDER - NSDCCPCAREPLAN_GEN_ALL_CORE_FT
PRINCIPAL DISCHARGE DIAGNOSIS  Diagnosis: Osteomyelitis of finger  Assessment and Plan of Treatment: HPI:  · 69yo male with no pertinent pmh presents with rt 5th PIP joint swelling x 2 weeks, started mildly but gettting worse/bigger. Seen by PMD 3 days ago, taken 3 days of abx, no improvement so came to ER.  Denies fever, trauma, open wounds/bug bites.	 No fever/chills. No photophobia/eye pain/changes in vision, No ear pain/sore throat/dysphagia, No chest pain/palpitations. No SOB/cough. No abdominal pain, No N/V/D, no black/bloody bm. No dysuria/frequency/discharge, No headache. No Dizziness.  No rash.  No numbness/tingling/weakness. (13 Aug 2019 15:28)  PT was admitted for concern of Right 5th finger osteomyelitis/septic joint, which was also seen on  MRI, but clinically this seems more likely d.t inflammatory arthropathy. Pt remains afebrile with normal WBC and symptoms greatly improved with NSAIDs started last night and is off antibiotics. The joint remains with out warmth and erythema. Hand surgeon could not aspirated any fluid from the joint for analysis today. After discussion with rheumatologist, infectious disease, and hand surgeon the plan is to be discharged on roal antibiotiocs for 10 days, NSAIDS and close f/u with the team as an outpatient.      Problem/Plan - 1:  ·  Problem: Osteomyelitis of finger.  Plan: mri of hand reviewed and highly suspicious of osteo and septic joint of  the right 5th PIP, but these findings can aslo be seen in crystal arthropathy.   f.u with Rheumatoid work up.    Problem/Plan - 2:  ·  Problem: Hyperlipemia.  Plan: continue home meds.    Problem/Plan - 3:  ·  Problem: Essential hypertension.  Plan: continue home meds.   Attending Attestation:   45 minutes spent on total encounter; more than 50% of the visit was spent counseling and/or coordinating care by the attending physician.

## 2019-08-18 LAB
CCP IGG SERPL-ACNC: <8 UNITS — SIGNIFICANT CHANGE UP (ref 0–19)
RF+CCP IGG SER-IMP: NEGATIVE — SIGNIFICANT CHANGE UP

## 2019-08-19 LAB
CULTURE RESULTS: SIGNIFICANT CHANGE UP
CULTURE RESULTS: SIGNIFICANT CHANGE UP
SPECIMEN SOURCE: SIGNIFICANT CHANGE UP
SPECIMEN SOURCE: SIGNIFICANT CHANGE UP

## 2019-08-26 DIAGNOSIS — I10 ESSENTIAL (PRIMARY) HYPERTENSION: ICD-10-CM

## 2019-08-26 DIAGNOSIS — M06.9 RHEUMATOID ARTHRITIS, UNSPECIFIED: ICD-10-CM

## 2019-08-26 DIAGNOSIS — M12.841: ICD-10-CM

## 2019-08-26 DIAGNOSIS — L03.113 CELLULITIS OF RIGHT UPPER LIMB: ICD-10-CM

## 2019-08-26 DIAGNOSIS — E78.5 HYPERLIPIDEMIA, UNSPECIFIED: ICD-10-CM

## 2021-06-03 NOTE — ED ADULT NURSE NOTE - NS ED PATIENT SAFETY CONCERN
"Renato Marquez  1949  [id number]    Hospitalists Discharge Summary    Date of Admission: 6/2/2021  Date of Discharge:  6/3/2021    Primary Discharge Diagnoses:  1.  GI bleed  2.  Reflux Esophagitis  3.  Multiple Gastric Ilcers  4.  Multiple Duodenal Ulcers  5.  Diverticulosis  6.  Obesity Body mass index is 32.28 kg/m².    Secondary Discharge Diagnoses:  1.  Chronic Atrial Fibrillation  2.  CAD  3.  KATIE    History of Present Illness (taken from H&P):  Mr. Marquez is a very pleasant, 72 y/o  male who presents after noticing some blood in his stool.  He reports the blood seemed to be intermittent, but would turn the bowel \"red\".  He had no associated nausea or abdominal cramping.  Over the last few days, it seems to be happening every time he moves his bowels.  He does note some constipation and hard stools.   This has happened to him previously after eating \"beets\".  He has had beets last week, but it doesn't appear to be related to this.  He denies dizziness and numbness in his legs.  He denies chest pain and palpitations.  He denies dyspnea. He does use ibuprofen on occasion for his OA.     Hospital Course:  Mr. Marquez was admitted to the Med/Surg unit.  He did not require transfusion.  Endoscopy findings as noted above.  He is to hold AC for 10-days.    PCP  Patient Care Team:  Everardo De Leon Jr., DO as PCP - General (Family Medicine)  Mauricio Scott III, MD as Consulting Physician (Cardiology)    Consults:   Consults     Date and Time Order Name Status Description    6/2/2021 11:14 AM Inpatient Gastroenterology Consult Completed           Operations and Procedures Performed:  Procedure(s):  ESOPHAGOGASTRODUODENOSCOPY with biopsies  COLONOSCOPY, polypectomies     No results found.    Allergies:  has No Known Allergies.    Cuco  not reviewed    Discharge Medications:     Discharge Medications      New Medications      Instructions Start Date   omeprazole 40 MG capsule  Commonly known as: " priLOSEC   40 mg, Oral, Daily         Changes to Medications      Instructions Start Date   lisinopril 10 MG tablet  Commonly known as: PRINIVIL,ZESTRIL  What changed: how much to take   10 mg, Oral, Daily         Continue These Medications      Instructions Start Date   furosemide 40 MG tablet  Commonly known as: LASIX   40 mg, Oral, Daily      Magnesium 200 MG tablet   250 mg, Oral, Daily      metoprolol succinate XL 50 MG 24 hr tablet  Commonly known as: TOPROL-XL   50 mg, Oral, Daily      pravastatin 40 MG tablet  Commonly known as: PRAVACHOL   40 mg, Oral, Daily         Stop These Medications    aspirin 81 MG EC tablet     rivaroxaban 20 MG tablet  Commonly known as: Xarelto            Last Lab Results:   Lab Results (most recent)     Procedure Component Value Units Date/Time    Basic Metabolic Panel [[id number]]  (Abnormal) Collected: 06/03/21 0432    Specimen: Blood Updated: 06/03/21 0514     Glucose 115 mg/dL      BUN 21 mg/dL      Creatinine 0.91 mg/dL      Sodium 139 mmol/L      Potassium 4.7 mmol/L      Chloride 104 mmol/L      CO2 27.4 mmol/L      Calcium 9.5 mg/dL      eGFR Non African Amer 82 mL/min/1.73      BUN/Creatinine Ratio 23.1     Anion Gap 7.6 mmol/L     Narrative:      GFR Normal >60  Chronic Kidney Disease <60  Kidney Failure <15      Protime-INR [[id number]]  (Abnormal) Collected: 06/03/21 0432    Specimen: Blood Updated: 06/03/21 0511     Protime 18.6 Seconds      INR 1.57    Narrative:      Therapeutic Ranges for INR: 2.0-3.0 (PT 20-30)                              2.5-3.5 (PT 25-34)    CBC & Differential [[id number]]  (Abnormal) Collected: 06/03/21 0432    Specimen: Blood Updated: 06/03/21 0448    Narrative:      The following orders were created for panel order CBC & Differential.  Procedure                               Abnormality         Status                     ---------                               -----------         ------                     CBC Auto Differential[[id number]]         Abnormal            Final result                 Please view results for these tests on the individual orders.    CBC Auto Differential [[id number]]  (Abnormal) Collected: 06/03/21 0432    Specimen: Blood Updated: 06/03/21 0448     WBC 5.62 10*3/mm3      RBC 4.02 10*6/mm3      Hemoglobin 12.9 g/dL      Hematocrit 40.5 %      .7 fL      MCH 32.1 pg      MCHC 31.9 g/dL      RDW 13.2 %      RDW-SD 49.0 fl      MPV 10.7 fL      Platelets 149 10*3/mm3      Neutrophil % 62.5 %      Lymphocyte % 23.3 %      Monocyte % 12.6 %      Eosinophil % 1.2 %      Basophil % 0.2 %      Immature Grans % 0.2 %      Neutrophils, Absolute 3.51 10*3/mm3      Lymphocytes, Absolute 1.31 10*3/mm3      Monocytes, Absolute 0.71 10*3/mm3      Eosinophils, Absolute 0.07 10*3/mm3      Basophils, Absolute 0.01 10*3/mm3      Immature Grans, Absolute 0.01 10*3/mm3      nRBC 0.0 /100 WBC     COVID PRE-OP / PRE-PROCEDURE SCREENING ORDER (NO ISOLATION) - Swab, Nasal Cavity [[id number]]  (Normal) Collected: 06/02/21 1052    Specimen: Swab from Nasal Cavity Updated: 06/02/21 1236    Narrative:      The following orders were created for panel order COVID PRE-OP / PRE-PROCEDURE SCREENING ORDER (NO ISOLATION) - Swab, Nasal Cavity.  Procedure                               Abnormality         Status                     ---------                               -----------         ------                     COVID-19,[employer] IN-NERI...[[id number]]  Normal              Final result                 Please view results for these tests on the individual orders.    COVID-19,[employer] IN-HOUSE,Nasal Swab No Transport Media 3-4 HR TAT - Swab, Nasal Cavity [[id number]]  (Normal) Collected: 06/02/21 1052    Specimen: Swab from Nasal Cavity Updated: 06/02/21 1236     COVID19 Not Detected    Narrative:      Fact sheet for providers: https://www.fda.gov/media/111427/download     Fact sheet for patients: https://www.fda.gov/media/915744/download    Test performed by  PCR.    Consider negative results in combination with clinical observations, patient history, and epidemiological information.    Comprehensive Metabolic Panel [[id number]]  (Abnormal) Collected: 06/02/21 0818    Specimen: Blood Updated: 06/02/21 0853     Glucose 112 mg/dL      BUN 29 mg/dL      Creatinine 1.15 mg/dL      Sodium 136 mmol/L      Potassium 4.7 mmol/L      Chloride 102 mmol/L      CO2 24.1 mmol/L      Calcium 9.2 mg/dL      Total Protein 6.2 g/dL      Albumin 3.70 g/dL      ALT (SGPT) 20 U/L      AST (SGOT) 29 U/L      Alkaline Phosphatase 90 U/L      Total Bilirubin 0.8 mg/dL      eGFR Non African Amer 63 mL/min/1.73      Globulin 2.5 gm/dL      A/G Ratio 1.5 g/dL      BUN/Creatinine Ratio 25.2     Anion Gap 9.9 mmol/L     Narrative:      GFR Normal >60  Chronic Kidney Disease <60  Kidney Failure <15      Protime-INR [[id number]]  (Abnormal) Collected: 06/02/21 0818    Specimen: Blood Updated: 06/02/21 0848     Protime 29.7 Seconds      INR 2.92    Narrative:      Therapeutic Ranges for INR: 2.0-3.0 (PT 20-30)                              2.5-3.5 (PT 25-34)    aPTT [[id number]]  (Abnormal) Collected: 06/02/21 0818    Specimen: Blood Updated: 06/02/21 0848     PTT 41.6 seconds     Narrative:      PTT = The equivalent PTT values for the therapeutic range of heparin levels at 0.1 to 0.7 U/ml are 53 to 110 seconds.      CBC & Differential [[id number]]  (Abnormal) Collected: 06/02/21 0818    Specimen: Blood Updated: 06/02/21 0829    Narrative:      The following orders were created for panel order CBC & Differential.  Procedure                               Abnormality         Status                     ---------                               -----------         ------                     CBC Auto Differential[[id number]]        Abnormal            Final result                 Please view results for these tests on the individual orders.    CBC Auto Differential [[id number]]  (Abnormal) Collected: 06/02/21 0818     Specimen: Blood Updated: 06/02/21 0829     WBC 5.98 10*3/mm3      RBC 3.92 10*6/mm3      Hemoglobin 12.6 g/dL      Hematocrit 39.1 %      MCV 99.7 fL      MCH 32.1 pg      MCHC 32.2 g/dL      RDW 13.0 %      RDW-SD 48.0 fl      MPV 10.4 fL      Platelets 141 10*3/mm3      Neutrophil % 70.6 %      Lymphocyte % 15.7 %      Monocyte % 12.5 %      Eosinophil % 0.8 %      Basophil % 0.2 %      Immature Grans % 0.2 %      Neutrophils, Absolute 4.22 10*3/mm3      Lymphocytes, Absolute 0.94 10*3/mm3      Monocytes, Absolute 0.75 10*3/mm3      Eosinophils, Absolute 0.05 10*3/mm3      Basophils, Absolute 0.01 10*3/mm3      Immature Grans, Absolute 0.01 10*3/mm3         Imaging Results (Most Recent)     None          PROCEDURES  Procedure(s):  ESOPHAGOGASTRODUODENOSCOPY with biopsies  COLONOSCOPY, polypectomies    Condition on Discharge:  Stable    Physical Exam at Discharge  Vital Signs  Temp:  [97.4 °F (36.3 °C)-98.2 °F (36.8 °C)] 97.8 °F (36.6 °C)  Heart Rate:  [] 72  Resp:  [16-18] 18  BP: ()/(42-91) 138/91    Physical Exam:  Physical Exam   Constitutional: Patient appears well-developed and well-nourished and in no acute distress.   Cardiovascular: Irregular rate and rhythm, S1 normal and S2 normal.  Exam reveals no gallop and no friction rub.  No murmur heard.  Pulmonary/Chest: Lungs are clear to auscultation bilaterally. No respiratory distress. No wheezes. No rhonchi. No rales.   Abdominal: Obese. Soft. Bowel sounds are normal. There is no tenderness.   Musculoskeletal: Normal Muscle tone  Extremities: No edema. No asymmetry.  Neurological: Cranial nerves II-XII are grossly intact with no focal deficits.    Discharge Disposition  Home    Visiting Nurse:    No     Home PT/OT:  No     Home Safety Evaluation:  No     DME  None    Discharge Diet:      Dietary Orders (From admission, onward)     Start     Ordered    06/03/21 1533  Diet Regular  Diet Effective Now     Question:  Diet Texture / Consistency   Answer:  Regular    06/03/21 1822                Activity at Discharge:  As tolerated    Follow-up Appointments  Future Appointments   Date Time Provider Department Center   6/16/2021 11:00 AM Sharon Amor APRN [facility]   12/29/2021 10:15 AM Mauricio Scott III, MD [facility]   5/20/2022  9:00 AM Jett Mcguire MD [facility] None     Additional Instructions for the Follow-ups that You Need to Schedule     Discharge Follow-up with PCP   As directed       Currently Documented PCP:    Everardo De Leon Jr.,     PCP Phone Number:    [phone]     Follow Up Details: 1-2 weeks         Discharge Follow-up with Specified Provider: Dr. Meek; 1 Month   As directed      To: Dr. Meek    Follow Up: 1 Month               Test Results Pending at Discharge  Pending Labs     Order Current Status    Tissue Pathology Exam Collected (06/03/21 7505)           Estevan Meyer MD  06/03/21  18:45 EDT   No

## 2024-04-04 PROBLEM — E78.5 HYPERLIPIDEMIA, UNSPECIFIED: Chronic | Status: ACTIVE | Noted: 2019-08-13

## 2024-04-04 PROBLEM — I10 ESSENTIAL (PRIMARY) HYPERTENSION: Chronic | Status: ACTIVE | Noted: 2019-08-13

## 2024-04-08 ENCOUNTER — APPOINTMENT (OUTPATIENT)
Dept: ORTHOPEDIC SURGERY | Facility: CLINIC | Age: 74
End: 2024-04-08
Payer: MEDICARE

## 2024-04-08 VITALS — HEIGHT: 70 IN | WEIGHT: 181 LBS | BODY MASS INDEX: 25.91 KG/M2

## 2024-04-08 DIAGNOSIS — C80.1 MALIGNANT (PRIMARY) NEOPLASM, UNSPECIFIED: ICD-10-CM

## 2024-04-08 DIAGNOSIS — M20.40 OTHER HAMMER TOE(S) (ACQUIRED), UNSPECIFIED FOOT: ICD-10-CM

## 2024-04-08 DIAGNOSIS — Z78.9 OTHER SPECIFIED HEALTH STATUS: ICD-10-CM

## 2024-04-08 DIAGNOSIS — M65.332 TRIGGER FINGER, LEFT MIDDLE FINGER: ICD-10-CM

## 2024-04-08 DIAGNOSIS — M25.541 PAIN IN JOINTS OF RIGHT HAND: ICD-10-CM

## 2024-04-08 DIAGNOSIS — M65.342 TRIGGER FINGER, LEFT RING FINGER: ICD-10-CM

## 2024-04-08 DIAGNOSIS — M25.542 PAIN IN JOINTS OF RIGHT HAND: ICD-10-CM

## 2024-04-08 PROCEDURE — 73130 X-RAY EXAM OF HAND: CPT | Mod: 50

## 2024-04-08 PROCEDURE — 73630 X-RAY EXAM OF FOOT: CPT | Mod: 50

## 2024-04-08 PROCEDURE — 99203 OFFICE O/P NEW LOW 30 MIN: CPT

## 2024-04-08 RX ORDER — LOSARTAN POTASSIUM 100 MG/1
TABLET, FILM COATED ORAL
Refills: 0 | Status: ACTIVE | COMMUNITY

## 2024-04-08 NOTE — ASSESSMENT
[FreeTextEntry1] : The patient was advised of the diagnosis. The natural history of the pathology was explained in full to the patient in layman's terms. All questions were answered. The risks and benefits of surgical and non-surgical treatment alternatives were explained in full to the patient.  Pt declines CSI Pt was referred to Rheumatologist F/u with Dr Carrasco for feet

## 2024-04-08 NOTE — IMAGING
[de-identified] : b/l hands: fingers are ulnarly deviated at MP right middle and ring finger triggering farom nvid  right foot: multiple hammer toes  xrays 3 views b/l hands and right foot show degenerative changes

## 2024-04-08 NOTE — HISTORY OF PRESENT ILLNESS
[de-identified] : 04/08/2024 COBY a male here for evaluation of Cristiano hands and feet. feels like the feet are numb. hard to grab things with the hands. fingers get stiff. EMG: severe b/l carpal tunnel syndrome and mild b/l cubital tunnel [] : Post Surgical Visit: no [FreeTextEntry1] : Cristiano feet [de-identified] : PCP [de-identified] : EMG

## 2024-04-09 ENCOUNTER — APPOINTMENT (OUTPATIENT)
Dept: ORTHOPEDIC SURGERY | Facility: CLINIC | Age: 74
End: 2024-04-09
Payer: MEDICARE

## 2024-04-09 DIAGNOSIS — M06.9 RHEUMATOID ARTHRITIS, UNSPECIFIED: ICD-10-CM

## 2024-04-09 PROCEDURE — 73630 X-RAY EXAM OF FOOT: CPT | Mod: 50

## 2024-04-09 PROCEDURE — 99203 OFFICE O/P NEW LOW 30 MIN: CPT

## 2024-04-09 NOTE — PHYSICAL EXAM
[Bilateral] : foot and ankle bilaterally [Mild] : mild swelling of toe(s) [1st] : 1st [2nd] : 2nd [3rd] : 3rd [4th] : 4th [5th] : 5th [1+] : dorsalis pedis pulse: 1+ [Decreased] : saphenous nerve sensation decreased [] : ambulation with cane

## 2024-04-09 NOTE — HISTORY OF PRESENT ILLNESS
[de-identified] : 4/9/24  bilateral foot pain and deformity.  Hx of R.A.  Back surgery 1 year ago due to neurogenic claudication

## 2024-04-09 NOTE — IMAGING
[Bilateral] : foot bilaterally [Weight -] : weightbearing [de-identified] : HV, ht, minimal djd, vascular calcifications

## 2024-05-14 ENCOUNTER — APPOINTMENT (OUTPATIENT)
Dept: ORTHOPEDIC SURGERY | Facility: CLINIC | Age: 74
End: 2024-05-14
Payer: MEDICARE

## 2024-05-14 DIAGNOSIS — M54.16 RADICULOPATHY, LUMBAR REGION: ICD-10-CM

## 2024-05-14 DIAGNOSIS — M77.41 METATARSALGIA, RIGHT FOOT: ICD-10-CM

## 2024-05-14 DIAGNOSIS — M77.42 METATARSALGIA, LEFT FOOT: ICD-10-CM

## 2024-05-14 PROCEDURE — 99213 OFFICE O/P EST LOW 20 MIN: CPT

## 2024-05-14 NOTE — DISCUSSION/SUMMARY
[de-identified] : Discussed orthopaedic laceup shoes with nehemiah randle suggested as well as pain management and spine follow-up

## 2024-05-14 NOTE — HISTORY OF PRESENT ILLNESS
[de-identified] : 4/9/24  bilateral foot pain and deformity.  Hx of R.A.  Back surgery 1 year ago due to neurogenic claudication 05/14/2024: f/u on B/L feet; continued numbness to both feet and pain to the lower back [FreeTextEntry1] : [employer] FEET

## 2024-05-14 NOTE — PHYSICAL EXAM
[Bilateral] : foot and ankle bilaterally [Mild] : mild swelling of toe(s) [1st] : 1st [2nd] : 2nd [3rd] : 3rd [4th] : 4th [5th] : 5th [1+] : dorsalis pedis pulse: 1+ [Decreased] : saphenous nerve sensation decreased [] : no calf tenderness

## 2024-12-24 ENCOUNTER — APPOINTMENT (OUTPATIENT)
Dept: ORTHOPEDIC SURGERY | Facility: CLINIC | Age: 74
End: 2024-12-24

## 2024-12-24 PROCEDURE — 73140 X-RAY EXAM OF FINGER(S): CPT | Mod: LT

## 2024-12-24 PROCEDURE — 99214 OFFICE O/P EST MOD 30 MIN: CPT

## 2024-12-24 RX ORDER — METHYLPREDNISOLONE 4 MG/1
4 TABLET ORAL
Qty: 1 | Refills: 1 | Status: ACTIVE | COMMUNITY
Start: 2024-12-24 | End: 1900-01-01

## 2024-12-26 ENCOUNTER — APPOINTMENT (OUTPATIENT)
Dept: ORTHOPEDIC SURGERY | Facility: CLINIC | Age: 74
End: 2024-12-26
Payer: MEDICARE

## 2024-12-26 ENCOUNTER — APPOINTMENT (OUTPATIENT)
Dept: MRI IMAGING | Facility: CLINIC | Age: 74
End: 2024-12-26
Payer: MEDICARE

## 2024-12-26 ENCOUNTER — APPOINTMENT (OUTPATIENT)
Dept: MRI IMAGING | Facility: CLINIC | Age: 74
End: 2024-12-26

## 2024-12-26 DIAGNOSIS — M19.042 PRIMARY OSTEOARTHRITIS, LEFT HAND: ICD-10-CM

## 2024-12-26 PROCEDURE — 99213 OFFICE O/P EST LOW 20 MIN: CPT

## 2024-12-26 PROCEDURE — 73218 MRI UPPER EXTREMITY W/O DYE: CPT | Mod: LT

## 2025-01-02 ENCOUNTER — APPOINTMENT (OUTPATIENT)
Dept: ORTHOPEDIC SURGERY | Facility: CLINIC | Age: 75
End: 2025-01-02
Payer: MEDICARE

## 2025-01-02 DIAGNOSIS — M25.449 EFFUSION, UNSPECIFIED HAND: ICD-10-CM

## 2025-01-02 DIAGNOSIS — M65.949 UNSPECIFIED SYNOVITIS AND TENOSYNOVITIS, UNSPECIFIED HAND: ICD-10-CM

## 2025-01-02 PROCEDURE — 99214 OFFICE O/P EST MOD 30 MIN: CPT | Mod: 25

## 2025-01-02 PROCEDURE — 20550 NJX 1 TENDON SHEATH/LIGAMENT: CPT | Mod: F3

## 2025-01-30 ENCOUNTER — APPOINTMENT (OUTPATIENT)
Dept: ORTHOPEDIC SURGERY | Facility: CLINIC | Age: 75
End: 2025-01-30
Payer: MEDICARE

## 2025-01-30 DIAGNOSIS — M65.949 UNSPECIFIED SYNOVITIS AND TENOSYNOVITIS, UNSPECIFIED HAND: ICD-10-CM

## 2025-01-30 PROCEDURE — 99214 OFFICE O/P EST MOD 30 MIN: CPT

## 2025-01-30 RX ORDER — PREDNISONE 20 MG/1
20 TABLET ORAL
Qty: 30 | Refills: 0 | Status: ACTIVE | COMMUNITY
Start: 2025-01-30 | End: 1900-01-01

## 2025-02-06 ENCOUNTER — APPOINTMENT (OUTPATIENT)
Dept: ORTHOPEDIC SURGERY | Facility: CLINIC | Age: 75
End: 2025-02-06
Payer: MEDICARE

## 2025-02-06 VITALS — HEIGHT: 70 IN | WEIGHT: 181 LBS | BODY MASS INDEX: 25.91 KG/M2

## 2025-02-06 DIAGNOSIS — M65.932 UNSPECIFIED SYNOVITIS AND TENOSYNOVITIS, LEFT FOREARM: ICD-10-CM

## 2025-02-06 PROCEDURE — 99213 OFFICE O/P EST LOW 20 MIN: CPT
